# Patient Record
Sex: FEMALE | Race: WHITE | Employment: FULL TIME | URBAN - METROPOLITAN AREA
[De-identification: names, ages, dates, MRNs, and addresses within clinical notes are randomized per-mention and may not be internally consistent; named-entity substitution may affect disease eponyms.]

---

## 2021-06-13 ENCOUNTER — HOSPITAL ENCOUNTER (EMERGENCY)
Age: 46
Discharge: HOME | End: 2021-06-13
Attending: EMERGENCY MEDICINE
Payer: COMMERCIAL

## 2021-06-13 VITALS
BODY MASS INDEX: 32.44 KG/M2 | HEIGHT: 64 IN | WEIGHT: 190 LBS | RESPIRATION RATE: 18 BRPM | DIASTOLIC BLOOD PRESSURE: 56 MMHG | HEART RATE: 65 BPM | OXYGEN SATURATION: 100 % | TEMPERATURE: 98.4 F | SYSTOLIC BLOOD PRESSURE: 111 MMHG

## 2021-06-13 DIAGNOSIS — G43.009 MIGRAINE WITHOUT AURA AND WITHOUT STATUS MIGRAINOSUS, NOT INTRACTABLE: Primary | ICD-10-CM

## 2021-06-13 DIAGNOSIS — R11.2 NON-INTRACTABLE VOMITING WITH NAUSEA, UNSPECIFIED VOMITING TYPE: ICD-10-CM

## 2021-06-13 LAB
A/G RATIO: 1.3 (ref 1.1–2.2)
ALBUMIN SERPL-MCNC: 4.3 G/DL (ref 3.4–5)
ALP BLD-CCNC: 51 U/L (ref 40–129)
ALT SERPL-CCNC: 31 U/L (ref 10–40)
ANION GAP SERPL CALCULATED.3IONS-SCNC: 16 MMOL/L (ref 3–16)
AST SERPL-CCNC: 31 U/L (ref 15–37)
BASOPHILS ABSOLUTE: 0.1 K/UL (ref 0–0.2)
BASOPHILS RELATIVE PERCENT: 0.7 %
BILIRUB SERPL-MCNC: 0.6 MG/DL (ref 0–1)
BILIRUBIN URINE: NEGATIVE
BLOOD, URINE: NEGATIVE
BUN BLDV-MCNC: 9 MG/DL (ref 7–20)
CALCIUM SERPL-MCNC: 9.8 MG/DL (ref 8.3–10.6)
CHLORIDE BLD-SCNC: 101 MMOL/L (ref 99–110)
CLARITY: ABNORMAL
CO2: 24 MMOL/L (ref 21–32)
COLOR: YELLOW
CREAT SERPL-MCNC: 0.8 MG/DL (ref 0.6–1.1)
EOSINOPHILS ABSOLUTE: 0.4 K/UL (ref 0–0.6)
EOSINOPHILS RELATIVE PERCENT: 3.1 %
EPITHELIAL CELLS, UA: NORMAL /HPF (ref 0–5)
GFR AFRICAN AMERICAN: >60
GFR NON-AFRICAN AMERICAN: >60
GLOBULIN: 3.4 G/DL
GLUCOSE BLD-MCNC: 114 MG/DL (ref 70–99)
GLUCOSE URINE: NEGATIVE MG/DL
HCG QUALITATIVE: NEGATIVE
HCT VFR BLD CALC: 42.9 % (ref 36–48)
HEMOGLOBIN: 14.5 G/DL (ref 12–16)
KETONES, URINE: 40 MG/DL
LEUKOCYTE ESTERASE, URINE: ABNORMAL
LYMPHOCYTES ABSOLUTE: 2.2 K/UL (ref 1–5.1)
LYMPHOCYTES RELATIVE PERCENT: 17.5 %
MCH RBC QN AUTO: 32.9 PG (ref 26–34)
MCHC RBC AUTO-ENTMCNC: 33.7 G/DL (ref 31–36)
MCV RBC AUTO: 97.6 FL (ref 80–100)
MICROSCOPIC EXAMINATION: YES
MONOCYTES ABSOLUTE: 0.5 K/UL (ref 0–1.3)
MONOCYTES RELATIVE PERCENT: 3.7 %
NEUTROPHILS ABSOLUTE: 9.5 K/UL (ref 1.7–7.7)
NEUTROPHILS RELATIVE PERCENT: 75 %
NITRITE, URINE: NEGATIVE
PDW BLD-RTO: 12.2 % (ref 12.4–15.4)
PH UA: 6 (ref 5–8)
PLATELET # BLD: 408 K/UL (ref 135–450)
PMV BLD AUTO: 7.8 FL (ref 5–10.5)
POTASSIUM SERPL-SCNC: 3.6 MMOL/L (ref 3.5–5.1)
PROTEIN UA: ABNORMAL MG/DL
RBC # BLD: 4.4 M/UL (ref 4–5.2)
RBC UA: NORMAL /HPF (ref 0–4)
SODIUM BLD-SCNC: 141 MMOL/L (ref 136–145)
SPECIFIC GRAVITY UA: 1.02 (ref 1–1.03)
TOTAL PROTEIN: 7.7 G/DL (ref 6.4–8.2)
URINE TYPE: ABNORMAL
UROBILINOGEN, URINE: 0.2 E.U./DL
WBC # BLD: 12.7 K/UL (ref 4–11)
WBC UA: NORMAL /HPF (ref 0–5)

## 2021-06-13 PROCEDURE — 81001 URINALYSIS AUTO W/SCOPE: CPT

## 2021-06-13 PROCEDURE — 85025 COMPLETE CBC W/AUTO DIFF WBC: CPT

## 2021-06-13 PROCEDURE — 96375 TX/PRO/DX INJ NEW DRUG ADDON: CPT

## 2021-06-13 PROCEDURE — 6360000002 HC RX W HCPCS: Performed by: EMERGENCY MEDICINE

## 2021-06-13 PROCEDURE — 2580000003 HC RX 258: Performed by: EMERGENCY MEDICINE

## 2021-06-13 PROCEDURE — 96374 THER/PROPH/DIAG INJ IV PUSH: CPT

## 2021-06-13 PROCEDURE — 99283 EMERGENCY DEPT VISIT LOW MDM: CPT

## 2021-06-13 PROCEDURE — 80053 COMPREHEN METABOLIC PANEL: CPT

## 2021-06-13 PROCEDURE — 84703 CHORIONIC GONADOTROPIN ASSAY: CPT

## 2021-06-13 RX ORDER — MULTIVITAMIN
1 TABLET ORAL DAILY
COMMUNITY

## 2021-06-13 RX ORDER — BUTALBITAL, ACETAMINOPHEN AND CAFFEINE 50; 325; 40 MG/1; MG/1; MG/1
1 TABLET ORAL EVERY 4 HOURS PRN
Qty: 20 TABLET | Refills: 0 | Status: SHIPPED | OUTPATIENT
Start: 2021-06-13

## 2021-06-13 RX ORDER — ERGOCALCIFEROL (VITAMIN D2) 1250 MCG
50000 CAPSULE ORAL WEEKLY
COMMUNITY
Start: 2021-05-19

## 2021-06-13 RX ORDER — ESCITALOPRAM OXALATE 10 MG/1
10 TABLET ORAL DAILY
COMMUNITY
Start: 2021-06-02

## 2021-06-13 RX ORDER — DIPHENHYDRAMINE HYDROCHLORIDE 50 MG/ML
25 INJECTION INTRAMUSCULAR; INTRAVENOUS ONCE
Status: COMPLETED | OUTPATIENT
Start: 2021-06-13 | End: 2021-06-13

## 2021-06-13 RX ORDER — METOCLOPRAMIDE HYDROCHLORIDE 5 MG/ML
10 INJECTION INTRAMUSCULAR; INTRAVENOUS ONCE
Status: COMPLETED | OUTPATIENT
Start: 2021-06-13 | End: 2021-06-13

## 2021-06-13 RX ORDER — 0.9 % SODIUM CHLORIDE 0.9 %
1000 INTRAVENOUS SOLUTION INTRAVENOUS ONCE
Status: COMPLETED | OUTPATIENT
Start: 2021-06-13 | End: 2021-06-13

## 2021-06-13 RX ORDER — LISINOPRIL AND HYDROCHLOROTHIAZIDE 12.5; 1 MG/1; MG/1
TABLET ORAL
COMMUNITY
Start: 2021-06-02

## 2021-06-13 RX ADMIN — METOCLOPRAMIDE HYDROCHLORIDE 10 MG: 5 INJECTION INTRAMUSCULAR; INTRAVENOUS at 08:26

## 2021-06-13 RX ADMIN — DIPHENHYDRAMINE HYDROCHLORIDE 25 MG: 50 INJECTION, SOLUTION INTRAMUSCULAR; INTRAVENOUS at 08:26

## 2021-06-13 RX ADMIN — SODIUM CHLORIDE 1000 ML: 9 INJECTION, SOLUTION INTRAVENOUS at 08:26

## 2021-06-13 NOTE — ED PROVIDER NOTES
No acute deformities. carpal spasms to hands improving in the ED  SKIN: Warm and dry. No acute rashes. NEUROLOGICAL: Alert and oriented X 3. CN II-XII intact. No gross facial drooping. Strength 5/5, sensation intact. Normal coordination. No pronator drift. No truncal instability; Gait normal.   PSYCHIATRIC: Normal mood and affect. LABS  I have reviewed all labs for this visit.    Results for orders placed or performed during the hospital encounter of 06/13/21   CBC auto differential   Result Value Ref Range    WBC 12.7 (H) 4.0 - 11.0 K/uL    RBC 4.40 4.00 - 5.20 M/uL    Hemoglobin 14.5 12.0 - 16.0 g/dL    Hematocrit 42.9 36.0 - 48.0 %    MCV 97.6 80.0 - 100.0 fL    MCH 32.9 26.0 - 34.0 pg    MCHC 33.7 31.0 - 36.0 g/dL    RDW 12.2 (L) 12.4 - 15.4 %    Platelets 117 759 - 110 K/uL    MPV 7.8 5.0 - 10.5 fL    Neutrophils % 75.0 %    Lymphocytes % 17.5 %    Monocytes % 3.7 %    Eosinophils % 3.1 %    Basophils % 0.7 %    Neutrophils Absolute 9.5 (H) 1.7 - 7.7 K/uL    Lymphocytes Absolute 2.2 1.0 - 5.1 K/uL    Monocytes Absolute 0.5 0.0 - 1.3 K/uL    Eosinophils Absolute 0.4 0.0 - 0.6 K/uL    Basophils Absolute 0.1 0.0 - 0.2 K/uL   Comprehensive metabolic panel   Result Value Ref Range    Sodium 141 136 - 145 mmol/L    Potassium 3.6 3.5 - 5.1 mmol/L    Chloride 101 99 - 110 mmol/L    CO2 24 21 - 32 mmol/L    Anion Gap 16 3 - 16    Glucose 114 (H) 70 - 99 mg/dL    BUN 9 7 - 20 mg/dL    CREATININE 0.8 0.6 - 1.1 mg/dL    GFR Non-African American >60 >60    GFR African American >60 >60    Calcium 9.8 8.3 - 10.6 mg/dL    Total Protein 7.7 6.4 - 8.2 g/dL    Albumin 4.3 3.4 - 5.0 g/dL    Albumin/Globulin Ratio 1.3 1.1 - 2.2    Total Bilirubin 0.6 0.0 - 1.0 mg/dL    Alkaline Phosphatase 51 40 - 129 U/L    ALT 31 10 - 40 U/L    AST 31 15 - 37 U/L    Globulin 3.4 g/dL   HCG Qualitative, Serum   Result Value Ref Range    hCG Qual Negative Detects HCG level >10 MIU/mL   Urinalysis, reflex to microscopic   Result Value Ref Range Color, UA Yellow Straw/Yellow    Clarity, UA SL CLOUDY (A) Clear    Glucose, Ur Negative Negative mg/dL    Bilirubin Urine Negative Negative    Ketones, Urine 40 (A) Negative mg/dL    Specific Gravity, UA 1.025 1.005 - 1.030    Blood, Urine Negative Negative    pH, UA 6.0 5.0 - 8.0    Protein, UA TRACE (A) Negative mg/dL    Urobilinogen, Urine 0.2 <2.0 E.U./dL    Nitrite, Urine Negative Negative    Leukocyte Esterase, Urine TRACE (A) Negative    Microscopic Examination YES     Urine Type NotGiven    Microscopic Urinalysis   Result Value Ref Range    WBC, UA 0-2 0 - 5 /HPF    RBC, UA None seen 0 - 4 /HPF    Epithelial Cells, UA 2-5 0 - 5 /HPF         RADIOLOGY  X-RAYS:  I have reviewed radiologic plain film image(s). ALL OTHER NON-PLAIN FILM IMAGES SUCH AS CT, ULTRASOUND AND MRI HAVE BEEN READ BY THE RADIOLOGIST. No orders to display        Rechecks: Physical assessment performed. Symptoms resolved with treatment in the ED    ED COURSE/MDM  Patient seen and evaluated. Old records reviewed. Labs and imaging reviewed and results discussed with patient. Patient was given reglan; benedryl; and IVF in the ED with good symptomatic relief. Patient was reassessed as noted above. No acute pathology was noted and pt is safe for discharge home to follow up with her primary care doctor. Symptoms most likely related to migraine headache and hyperventilation. Plan of care discussed with patient and family. Patient and family in agreement with plan. Patient was given scripts for the following medications. I counseled patient how to take these medications. Discharge Medication List as of 6/13/2021 11:38 AM      START taking these medications    Details   butalbital-acetaminophen-caffeine (FIORICET, ESGIC) -40 MG per tablet Take 1 tablet by mouth every 4 hours as needed for Headaches, Disp-20 tablet, R-0Normal             CLINICAL IMPRESSION  1.  Migraine without aura and without status migrainosus, not intractable    2. Non-intractable vomiting with nausea, unspecified vomiting type        Blood pressure (!) 111/56, pulse 65, temperature 98.4 °F (36.9 °C), temperature source Oral, resp. rate 18, height 5' 4\" (1.626 m), weight 190 lb (86.2 kg), SpO2 100 %. DISPOSITION  Raimundo Oliva was discharged to home in stable condition.        Jamel Willis MD  06/14/21 [id number]

## 2023-11-20 ENCOUNTER — TELEPHONE (OUTPATIENT)
Dept: SURGERY | Age: 48
End: 2023-11-20

## 2023-11-20 NOTE — TELEPHONE ENCOUNTER
LVM for patient to call the office to schedule office visit with Dr. Henrik Canas from Dr. Felix Benedict for large anal skin tag.